# Patient Record
Sex: FEMALE | Race: WHITE | NOT HISPANIC OR LATINO
[De-identification: names, ages, dates, MRNs, and addresses within clinical notes are randomized per-mention and may not be internally consistent; named-entity substitution may affect disease eponyms.]

---

## 2019-09-25 PROBLEM — Z00.00 ENCOUNTER FOR PREVENTIVE HEALTH EXAMINATION: Status: ACTIVE | Noted: 2019-09-25

## 2019-10-24 ENCOUNTER — APPOINTMENT (OUTPATIENT)
Dept: CARDIOLOGY | Facility: CLINIC | Age: 49
End: 2019-10-24
Payer: COMMERCIAL

## 2019-10-24 ENCOUNTER — NON-APPOINTMENT (OUTPATIENT)
Age: 49
End: 2019-10-24

## 2019-10-24 VITALS
HEIGHT: 67 IN | OXYGEN SATURATION: 95 % | BODY MASS INDEX: 27.78 KG/M2 | WEIGHT: 177 LBS | DIASTOLIC BLOOD PRESSURE: 78 MMHG | HEART RATE: 66 BPM | SYSTOLIC BLOOD PRESSURE: 112 MMHG

## 2019-10-24 DIAGNOSIS — F98.8 OTHER SPECIFIED BEHAVIORAL AND EMOTIONAL DISORDERS WITH ONSET USUALLY OCCURRING IN CHILDHOOD AND ADOLESCENCE: ICD-10-CM

## 2019-10-24 DIAGNOSIS — R06.02 SHORTNESS OF BREATH: ICD-10-CM

## 2019-10-24 DIAGNOSIS — R00.2 PALPITATIONS: ICD-10-CM

## 2019-10-24 DIAGNOSIS — J44.9 CHRONIC OBSTRUCTIVE PULMONARY DISEASE, UNSPECIFIED: ICD-10-CM

## 2019-10-24 DIAGNOSIS — Z80.0 FAMILY HISTORY OF MALIGNANT NEOPLASM OF DIGESTIVE ORGANS: ICD-10-CM

## 2019-10-24 DIAGNOSIS — F41.9 ANXIETY DISORDER, UNSPECIFIED: ICD-10-CM

## 2019-10-24 PROCEDURE — 93000 ELECTROCARDIOGRAM COMPLETE: CPT

## 2019-10-24 PROCEDURE — 99205 OFFICE O/P NEW HI 60 MIN: CPT

## 2019-10-24 RX ORDER — MONTELUKAST 10 MG/1
10 TABLET, FILM COATED ORAL
Refills: 3 | Status: ACTIVE | COMMUNITY
Start: 2019-10-24

## 2019-10-24 RX ORDER — ESCITALOPRAM OXALATE 20 MG/1
20 TABLET ORAL DAILY
Qty: 30 | Refills: 5 | Status: ACTIVE | COMMUNITY
Start: 2019-10-24

## 2019-10-24 RX ORDER — BUDESONIDE AND FORMOTEROL FUMARATE DIHYDRATE 80; 4.5 UG/1; UG/1
80-4.5 AEROSOL RESPIRATORY (INHALATION) TWICE DAILY
Refills: 0 | Status: ACTIVE | COMMUNITY
Start: 2019-10-24

## 2019-10-24 RX ORDER — ALBUTEROL SULFATE 90 UG/1
108 (90 BASE) AEROSOL, METERED RESPIRATORY (INHALATION)
Refills: 0 | Status: ACTIVE | COMMUNITY
Start: 2019-10-24

## 2019-10-24 RX ORDER — LISDEXAMFETAMINE DIMESYLATE 70 MG/1
70 CAPSULE ORAL DAILY
Refills: 0 | Status: ACTIVE | COMMUNITY
Start: 2019-10-24

## 2019-10-24 NOTE — REVIEW OF SYSTEMS
[Dyspnea on exertion] : dyspnea during exertion [Eyeglasses] : currently wearing eyeglasses [Cough] : cough [Abdominal Pain] : abdominal pain [Negative] : Heme/Lymph

## 2019-11-01 ENCOUNTER — APPOINTMENT (OUTPATIENT)
Dept: CV DIAGNOSTICS | Facility: HOSPITAL | Age: 49
End: 2019-11-01

## 2019-11-01 ENCOUNTER — OUTPATIENT (OUTPATIENT)
Dept: OUTPATIENT SERVICES | Facility: HOSPITAL | Age: 49
LOS: 1 days | End: 2019-11-01
Payer: COMMERCIAL

## 2019-11-01 ENCOUNTER — APPOINTMENT (OUTPATIENT)
Dept: CV DIAGNOSITCS | Facility: HOSPITAL | Age: 49
End: 2019-11-01

## 2019-11-01 DIAGNOSIS — R06.02 SHORTNESS OF BREATH: ICD-10-CM

## 2019-11-01 DIAGNOSIS — R00.2 PALPITATIONS: ICD-10-CM

## 2019-11-01 PROCEDURE — 93018 CV STRESS TEST I&R ONLY: CPT

## 2019-11-01 PROCEDURE — 93016 CV STRESS TEST SUPVJ ONLY: CPT

## 2019-11-01 PROCEDURE — 93306 TTE W/DOPPLER COMPLETE: CPT

## 2019-11-01 PROCEDURE — 93306 TTE W/DOPPLER COMPLETE: CPT | Mod: 26

## 2019-11-01 PROCEDURE — 93017 CV STRESS TEST TRACING ONLY: CPT

## 2019-11-06 NOTE — HISTORY OF PRESENT ILLNESS
[FreeTextEntry1] : 49 year old female with no significant family history for cardiovascular disease. She carries a personal history of  anxiety ADD on Vyvanse, COPD (according to the patient, her COPD is secondary to a " rare pneumonia of unknown bacterial origin") .\par \par She presents today reporting that she has been experiencing exertional shortness of breath and palpitations that have become more frequent. \par \par Of note, she has begun to feel intermittent hot flashes and other associated  perimenopausal symptoms. \par She does describes frequent symptoms of heartburn and left upper quadrant pain after eating.

## 2019-11-06 NOTE — DISCUSSION/SUMMARY
[FreeTextEntry1] : 49 year old female with no significant family history for cardiovascular disease. She carries a personal history of ADD on Vyvanse, COPD ( secondary to " rare pneumonia of unknown bacterial origin") and anxiety.\par \par She presents today reporting that she has been experiencing exertional shortness of breath and palpitations that have become more frequent. \par \par Of note, she has begun to feel intermittent hot flashes and other associated  perimenopausal symptoms. \par She does describes frequent symptoms of heartburn and left upper quadrant pain after eating. \par - will refer the patient for an echocardiogram and an exercise stress test\par - will place a cardiopatch\par - BP well controlled\par - ECG with no acute changes\par - weight loss and exercise encouraged\par - advised patient to be cautious regarding her ADD medications

## 2019-11-06 NOTE — ASSESSMENT
[FreeTextEntry1] : 49 year old female with  personal history of ADD on Vyvanse, COPD ( secondary to " rare pneumonia of unknown bacterial origin") and anxiety.\par \par She presents today with cpm

## 2020-03-12 ENCOUNTER — APPOINTMENT (OUTPATIENT)
Dept: PLASTIC SURGERY | Facility: CLINIC | Age: 50
End: 2020-03-12
Payer: COMMERCIAL

## 2020-03-12 VITALS — BODY MASS INDEX: 22.29 KG/M2 | WEIGHT: 142 LBS | HEIGHT: 67 IN

## 2020-03-12 PROCEDURE — 99202 OFFICE O/P NEW SF 15 MIN: CPT

## 2020-03-13 NOTE — HISTORY OF PRESENT ILLNESS
[FreeTextEntry1] : Ms. Starr presents for consultation with symptoms of macromastia. Specifically she is bothered by severe neck, shoulder, back pain, and bra strap grooving.  Additionally she experiences intermittent skin inflammation in the breast folds. Her symptoms are not relieved with conventional conservative therapy.\par Today we reviewed options for surgical breast reduction.\par \par Current bra size of 38G \par Weight of 142lbs, height of 5'7" with BSA of 1.748\par On exam the patient has large pendulous breasts which are consistent with the constellation of reported symptoms. No masses, no nipple inversion, no nipple discharge.\par \par Patient reports last mammogram/ultrasound was completed within the last year and was reportedly benign. \par \par To note, patient has COPD secondary to a bout of bacterial pneumonia 5 years ago\par \par Today we reviewed the nature of breast reduction surgery, its risks, benefits, alternatives, and expected recovery and postoperative course. Based on Ms. Starr's symptoms and my physical exam I anticipate removing in excess of 800 g of breast tissue from each breast, which is even more then the 450 g predicted by the Schnur sliding scale.\par Ms. Starr is motivated to proceed.\par We will coordinate surgery for the near future.

## 2020-08-19 NOTE — PHYSICAL EXAM
[General Appearance - Well Developed] : well developed [Normal Appearance] : normal appearance [Well Groomed] : well groomed 2018- pt is going for cardiology consult  on 8/20/20 [General Appearance - Well Nourished] : well nourished [No Deformities] : no deformities [Normal Conjunctiva] : the conjunctiva exhibited no abnormalities [General Appearance - In No Acute Distress] : no acute distress [Eyelids - No Xanthelasma] : the eyelids demonstrated no xanthelasmas [Normal Oral Mucosa] : normal oral mucosa [No Oral Pallor] : no oral pallor [No Oral Cyanosis] : no oral cyanosis [Normal Jugular Venous A Waves Present] : normal jugular venous A waves present [No Jugular Venous Gabriel A Waves] : no jugular venous gabriel A waves [Normal Jugular Venous V Waves Present] : normal jugular venous V waves present [Normal] : normal [Normal Rate] : normal [Normal S2] : normal S2 [No Murmur] : no murmurs heard [2+] : left 2+ [Respiration, Rhythm And Depth] : normal respiratory rhythm and effort [Exaggerated Use Of Accessory Muscles For Inspiration] : no accessory muscle use [Auscultation Breath Sounds / Voice Sounds] : lungs were clear to auscultation bilaterally [Abdomen Soft] : soft [Abdomen Mass (___ Cm)] : no abdominal mass palpated [Abdomen Tenderness] : non-tender [Abnormal Walk] : normal gait [Cyanosis, Localized] : no localized cyanosis [Nail Clubbing] : no clubbing of the fingernails [Gait - Sufficient For Exercise Testing] : the gait was sufficient for exercise testing [Petechial Hemorrhages (___cm)] : no petechial hemorrhages [] : no rash [Skin Color & Pigmentation] : normal skin color and pigmentation [No Venous Stasis] : no venous stasis [No Xanthoma] : no  xanthoma was observed [Skin Lesions] : no skin lesions [No Skin Ulcers] : no skin ulcer [FreeTextEntry1] : anxious

## 2020-12-04 DIAGNOSIS — Z20.828 CONTACT WITH AND (SUSPECTED) EXPOSURE TO OTHER VIRAL COMMUNICABLE DISEASES: ICD-10-CM

## 2020-12-06 LAB — SARS-COV-2 N GENE NPH QL NAA+PROBE: NOT DETECTED

## 2020-12-27 LAB — SARS-COV-2 N GENE NPH QL NAA+PROBE: NOT DETECTED

## 2021-01-11 ENCOUNTER — TRANSCRIPTION ENCOUNTER (OUTPATIENT)
Age: 51
End: 2021-01-11

## 2021-02-08 ENCOUNTER — TRANSCRIPTION ENCOUNTER (OUTPATIENT)
Age: 51
End: 2021-02-08

## 2021-06-16 ENCOUNTER — APPOINTMENT (OUTPATIENT)
Dept: PLASTIC SURGERY | Facility: CLINIC | Age: 51
End: 2021-06-16
Payer: COMMERCIAL

## 2021-06-16 PROCEDURE — 99499 UNLISTED E&M SERVICE: CPT | Mod: NC

## 2021-06-17 NOTE — HISTORY OF PRESENT ILLNESS
[FreeTextEntry1] : Ms. Starr is known to me from a  previous consultation for breast reduction. \par She suffers form symptomatic of macromastia. Specifically she is bothered by severe neck, shoulder, back pain, and bra strap grooving.  Additionally she experiences intermittent skin inflammation in the breast folds. Her symptoms are not relieved with conventional conservative therapy.\par Today we again reviewed options for surgical breast reduction.\par \par Current bra size of 38G \par Weight of 142lbs, height of 5'7" with BSA of 1.748\par On exam the patient has large pendulous breasts which are consistent with the constellation of reported symptoms. No masses, no nipple inversion, no nipple discharge.\par \par Patient reports last mammogram/ultrasound was completed within the last year and was reportedly benign. \par \par To note, patient has COPD secondary to a bout of bacterial pneumonia 5 years ago\par \par Today we reviewed the nature of breast reduction surgery, its risks, benefits, alternatives, and expected recovery and postoperative course. Based on Ms. Starr's symptoms and my physical exam I anticipate removing in excess of 800 g of breast tissue from each breast, which is even more then the 450 g predicted by the Schnur sliding scale.\par Ms. Starr is motivated to proceed.\par We will coordinate surgery for the near future.

## 2021-08-09 ENCOUNTER — APPOINTMENT (OUTPATIENT)
Dept: PLASTIC SURGERY | Facility: CLINIC | Age: 51
End: 2021-08-09
Payer: SELF-PAY

## 2021-08-09 PROCEDURE — 99499 UNLISTED E&M SERVICE: CPT | Mod: NC

## 2021-08-17 NOTE — HISTORY OF PRESENT ILLNESS
[FreeTextEntry1] : Ms. Starr is known to me from a  previous consultation for breast reduction. \par She suffers form symptomatic of macromastia. Specifically she is bothered by severe neck, shoulder, back pain, and bra strap grooving.  Additionally she experiences intermittent skin inflammation in the breast folds. Her symptoms are not relieved with conventional conservative therapy, which includes more than three months of NSAID use, use of special bras, wearing of two bras simultaneously, posture measures, and a tial of physical therapy. Additionally she has been previously evaluated by a spine surgeon who also attributed her back symtoms to her large breasts.\par Today we again reviewed options for surgical breast reduction.\par \par Current bra size of 38G \par Weight of 142lbs, height of 5'7" with BSA of 1.748\par On exam the patient has large pendulous breasts which are consistent with the constellation of reported symptoms. No masses, no nipple inversion, no nipple discharge.\par \par Patient reports last mammogram/ultrasound was completed within the last year and is benign. \par \par To note, patient has COPD secondary to a bout of bacterial pneumonia 5 years ago\par \par Today we reviewed the nature of breast reduction surgery, its risks, benefits, alternatives, and expected recovery and postoperative course. Based on Ms. Starr's symptoms and my physical exam I anticipate removing in excess of 800 g of breast tissue from each breast, which is even more then the 450 g predicted by the Schnur sliding scale.\par Ms. Starr is motivated to proceed.\par We will coordinate surgery for the near future.

## 2021-08-19 ENCOUNTER — APPOINTMENT (OUTPATIENT)
Dept: CARDIOLOGY | Facility: CLINIC | Age: 51
End: 2021-08-19
Payer: COMMERCIAL

## 2021-08-19 VITALS
SYSTOLIC BLOOD PRESSURE: 145 MMHG | DIASTOLIC BLOOD PRESSURE: 86 MMHG | BODY MASS INDEX: 26.68 KG/M2 | HEART RATE: 95 BPM | HEIGHT: 67 IN | WEIGHT: 170 LBS | OXYGEN SATURATION: 97 %

## 2021-08-19 DIAGNOSIS — R73.03 PREDIABETES.: ICD-10-CM

## 2021-08-19 PROCEDURE — 93000 ELECTROCARDIOGRAM COMPLETE: CPT

## 2021-08-19 PROCEDURE — 99215 OFFICE O/P EST HI 40 MIN: CPT

## 2021-08-20 ENCOUNTER — APPOINTMENT (OUTPATIENT)
Dept: CARDIOLOGY | Facility: CLINIC | Age: 51
End: 2021-08-20
Payer: COMMERCIAL

## 2021-08-20 ENCOUNTER — APPOINTMENT (OUTPATIENT)
Dept: PLASTIC SURGERY | Facility: CLINIC | Age: 51
End: 2021-08-20
Payer: COMMERCIAL

## 2021-08-20 PROCEDURE — 93306 TTE W/DOPPLER COMPLETE: CPT

## 2021-08-20 PROCEDURE — 99241 OFFICE CONSULTATION NEW/ESTAB PATIENT 15 MIN: CPT | Mod: GT

## 2021-08-20 RX ORDER — OXYCODONE AND ACETAMINOPHEN 5; 325 MG/1; MG/1
5-325 TABLET ORAL
Qty: 20 | Refills: 0 | Status: ACTIVE | COMMUNITY
Start: 2021-08-20 | End: 1900-01-01

## 2021-08-20 NOTE — HISTORY OF PRESENT ILLNESS
[Home] : at home, [unfilled] , at the time of the visit. [Medical Office: ([facility])___] : at the medical office located in  [Verbal consent obtained from patient] : the patient, [unfilled] [FreeTextEntry1] : Patient Name: JANI LAZO \par : Mar 28 1970 \par Date: 2021 \par Attending: Dr. Martin Cedillo\par \par HPI: pre-op consultation for bilateral breast reduction and liposuction on 21.\par \par Allergies: NKDA\par Medication prescribed: percocet 5-325 mg\par \par ROS: complete 14 point review of systems negative except pertinent items reviewed in the HPI. Other non-contributory items reviewed in our new patient questionnaire and we have submitted it to be scanned into the medical record. \par \par Physical Exam: \par completed at time of in office evaluation \par \par Assessment/Plan:\par We have discussed pre and postop instructions, recovery limitations, restrictions and expectations, ERAS protocol, NPO status, transportation home, postop medications, COVID-19 policies, benefits and risks of the procedure. The patient would like to proceed with surgery as scheduled.\par \par MIRELA BASURTO NP\par \par

## 2021-08-24 ENCOUNTER — LABORATORY RESULT (OUTPATIENT)
Age: 51
End: 2021-08-24

## 2021-08-24 NOTE — DISCUSSION/SUMMARY
[FreeTextEntry1] : 51 year old female with no significant family history for cardiovascular disease. She carries a personal history of  anxiety ADD on Vyvanse, COPD (according to the patient, her COPD is secondary to a " rare pneumonia of unknown bacterial origin") .\par \par She presents today for a cardiac clearance for a breast reduction in setting of thr PCP finding an ecg abnormality. She has not been very active but is asymptomatic - denies chest pain, SOB/ZAMORA, palpitations, lightheadedness or syncope\par - ECG with no acute ischemic changes (stable from prior)\par - BP stable. Encouraged the patient to monitor blood pressure at home, keep a log, and report results back to us for evaluation. Based on results, we will adjust the regimen as necessary.\par - At the current time, patient is at acceptable risk for the procedure. Patient is asymptomatic with no evidence of acute decompensated heart failure, unstable angina or arrhythmias. No further cardiac work up needed at this time.\par - echo done to re-evaluate the LV function is normal \par - weight loss and exercise encouraged\par - Encouraged patient to continue healthy exercise and eating habits, focusing on a Mediterranean style of eating and aiming for the recommended 150 minutes per week of moderate physical activity.\par - Encouraged the patient to find healthy outlets and coping mechanisms to help manage stress, such as physical activity/exercise, reducing workload if possible, spending time with family and friends, engaging in an enjoyable hobby, or using meditation or mindfulness techniques.\par \par \par

## 2021-08-24 NOTE — HISTORY OF PRESENT ILLNESS
[FreeTextEntry1] : 51 year old female with no significant family history for cardiovascular disease. She carries a personal history of  anxiety ADD on Vyvanse, COPD (according to the patient, her COPD is secondary to a " rare pneumonia of unknown bacterial origin") .\par \par She presents today for a cardiac clearance for a breast reduction in setting of thr PCP finding an ecg abnormality. She has not been very active but is asymptomatic - denies chest pain, SOB/ZAMORA, palpitations, lightheadedness or syncope\par \par

## 2021-08-26 ENCOUNTER — TRANSCRIPTION ENCOUNTER (OUTPATIENT)
Age: 51
End: 2021-08-26

## 2021-08-27 ENCOUNTER — OUTPATIENT (OUTPATIENT)
Dept: OUTPATIENT SERVICES | Facility: HOSPITAL | Age: 51
LOS: 1 days | Discharge: HOME | End: 2021-08-27
Payer: COMMERCIAL

## 2021-08-27 ENCOUNTER — RESULT REVIEW (OUTPATIENT)
Age: 51
End: 2021-08-27

## 2021-08-27 PROCEDURE — 88305 TISSUE EXAM BY PATHOLOGIST: CPT | Mod: 26

## 2021-08-27 PROCEDURE — 19318 BREAST REDUCTION: CPT | Mod: 50

## 2021-08-30 ENCOUNTER — APPOINTMENT (OUTPATIENT)
Dept: PLASTIC SURGERY | Facility: CLINIC | Age: 51
End: 2021-08-30
Payer: COMMERCIAL

## 2021-08-30 VITALS — HEART RATE: 82 BPM | WEIGHT: 170 LBS | BODY MASS INDEX: 26.68 KG/M2 | OXYGEN SATURATION: 97 % | HEIGHT: 67 IN

## 2021-08-30 LAB — SURGICAL PATHOLOGY STUDY: SIGNIFICANT CHANGE UP

## 2021-08-30 PROCEDURE — 99024 POSTOP FOLLOW-UP VISIT: CPT

## 2021-08-31 NOTE — HISTORY OF PRESENT ILLNESS
[FreeTextEntry1] : 52 y/o female presents 3 days s/p bilateral breast reduction and liposuction. Denies f/c/n/v. 2 SHILA drains in place to suction. She is taking percocet as needed for pain relief. \par \par PE: Incisions c/d/i, no collections or s/sx of infection, left breast lateral fullness, moderate ecchymosis consistent with postop expectations b/l axilla, steris removed\par \par Gauze to b/l incisions/sports bra x 6 weeks\par D/C abdominal binder.\par SHILA drains removed\par RTC in 1 month

## 2021-09-27 ENCOUNTER — APPOINTMENT (OUTPATIENT)
Dept: PLASTIC SURGERY | Facility: CLINIC | Age: 51
End: 2021-09-27
Payer: COMMERCIAL

## 2021-09-27 VITALS — WEIGHT: 170 LBS | HEIGHT: 67 IN | OXYGEN SATURATION: 98 % | HEART RATE: 82 BPM | BODY MASS INDEX: 26.68 KG/M2

## 2021-09-27 DIAGNOSIS — N62 HYPERTROPHY OF BREAST: ICD-10-CM

## 2021-09-27 PROCEDURE — 99024 POSTOP FOLLOW-UP VISIT: CPT

## 2021-09-28 NOTE — HISTORY OF PRESENT ILLNESS
[FreeTextEntry1] : 52 y/o female presents 3 weeks s/p bilateral breast reduction and liposuction. Denies f/c/n/v. She is very happy with her results. She reports improvement in her back pain. \par \par PE: Incisions healing well, no collections or s/sx of infection, left breast lateral fullness resolving\par \par Silicone strips to incisions\par Postop instructions reviewed\par RTC in 3-4 months\par

## 2021-11-07 PROBLEM — R73.03 PRE-DIABETES: Status: ACTIVE | Noted: 2021-11-07

## 2021-12-16 NOTE — HISTORY OF PRESENT ILLNESS
[FreeTextEntry1] : 52 y/o female presents 4 months s/p bilateral breast reduction and liposuction. Denies f/c/n/v. She is very happy with her results. She reports improvement in her back pain. \par \par PE: Incisions healing well, no collections or s/sx of infection, left breast lateral fullness resolving\par \par Silicone strips to incisions\par Postop instructions reviewed\par RTC in 3-4 months\par

## 2021-12-17 ENCOUNTER — NON-APPOINTMENT (OUTPATIENT)
Age: 51
End: 2021-12-17

## 2021-12-20 ENCOUNTER — APPOINTMENT (OUTPATIENT)
Dept: PLASTIC SURGERY | Facility: CLINIC | Age: 51
End: 2021-12-20

## 2022-05-10 ENCOUNTER — APPOINTMENT (OUTPATIENT)
Dept: SURGERY | Facility: CLINIC | Age: 52
End: 2022-05-10

## 2022-05-10 VITALS — HEIGHT: 64.5 IN | BODY MASS INDEX: 30.03 KG/M2 | WEIGHT: 178.04 LBS

## 2022-05-10 RX ORDER — SEMAGLUTIDE 1.34 MG/ML
2 INJECTION, SOLUTION SUBCUTANEOUS WEEKLY
Qty: 3 | Refills: 0 | Status: COMPLETED | COMMUNITY
Start: 2022-05-10

## 2022-05-10 NOTE — HISTORY OF PRESENT ILLNESS
[FreeTextEntry1] : I had the pleasure of seeing Teressa Starr today in my office for a new breast evaluation.\par \par Imaging\par mammo [facility] 1/19/21-No cicumscribed mass, suspicious mass, microcalcification cluster, nor area of architectural distortion again seen in both breasts. No skin thickening nor nipple retraction again seen in both breasts. No abnormal lymphadenopathy in both axilla.\par \par ultrasound 1/19/21-in comparison to 1/18/20 no change in left breast. Right breast There is an unchanged hypoechoeic solid tissue nodule again seen at 7:00 6cm from nipple measuring 8x4cm

## 2022-06-01 RX ORDER — SEMAGLUTIDE 1.34 MG/ML
2 INJECTION, SOLUTION SUBCUTANEOUS
Qty: 3 | Refills: 0 | Status: ACTIVE | COMMUNITY
Start: 2022-06-01 | End: 1900-01-01

## 2022-06-13 DIAGNOSIS — R11.0 NAUSEA: ICD-10-CM

## 2022-09-12 ENCOUNTER — RX RENEWAL (OUTPATIENT)
Age: 52
End: 2022-09-12

## 2022-09-12 RX ORDER — ONDANSETRON 4 MG/1
4 TABLET ORAL
Qty: 30 | Refills: 0 | Status: ACTIVE | COMMUNITY
Start: 2022-09-12 | End: 1900-01-01

## 2022-09-22 ENCOUNTER — APPOINTMENT (OUTPATIENT)
Dept: HEMATOLOGY ONCOLOGY | Facility: CLINIC | Age: 52
End: 2022-09-22

## 2022-09-22 ENCOUNTER — LABORATORY RESULT (OUTPATIENT)
Age: 52
End: 2022-09-22

## 2022-09-23 LAB
ALBUMIN SERPL ELPH-MCNC: 4.7 G/DL
ALP BLD-CCNC: 89 U/L
ALT SERPL-CCNC: 36 U/L
ANION GAP SERPL CALC-SCNC: 14 MMOL/L
AST SERPL-CCNC: 25 U/L
BILIRUB SERPL-MCNC: 0.3 MG/DL
BUN SERPL-MCNC: 13 MG/DL
CALCIUM SERPL-MCNC: 9.7 MG/DL
CHLORIDE SERPL-SCNC: 106 MMOL/L
CO2 SERPL-SCNC: 24 MMOL/L
CREAT SERPL-MCNC: 0.73 MG/DL
EGFR: 98 ML/MIN/1.73M2
GLUCOSE SERPL-MCNC: 112 MG/DL
POTASSIUM SERPL-SCNC: 4.6 MMOL/L
PROT SERPL-MCNC: 6.9 G/DL
SODIUM SERPL-SCNC: 144 MMOL/L

## 2024-02-15 ENCOUNTER — APPOINTMENT (OUTPATIENT)
Dept: CARDIOLOGY | Facility: CLINIC | Age: 54
End: 2024-02-15
Payer: COMMERCIAL

## 2024-02-15 ENCOUNTER — RESULT REVIEW (OUTPATIENT)
Age: 54
End: 2024-02-15

## 2024-02-15 VITALS
BODY MASS INDEX: 27.1 KG/M2 | DIASTOLIC BLOOD PRESSURE: 77 MMHG | WEIGHT: 153 LBS | HEART RATE: 83 BPM | OXYGEN SATURATION: 99 % | SYSTOLIC BLOOD PRESSURE: 116 MMHG

## 2024-02-15 DIAGNOSIS — R00.2 PALPITATIONS: ICD-10-CM

## 2024-02-15 DIAGNOSIS — R94.31 ABNORMAL ELECTROCARDIOGRAM [ECG] [EKG]: ICD-10-CM

## 2024-02-15 PROCEDURE — 93000 ELECTROCARDIOGRAM COMPLETE: CPT

## 2024-02-15 PROCEDURE — 99214 OFFICE O/P EST MOD 30 MIN: CPT | Mod: 25

## 2024-02-16 ENCOUNTER — APPOINTMENT (OUTPATIENT)
Dept: CV DIAGNOSTICS | Facility: HOSPITAL | Age: 54
End: 2024-02-16

## 2024-02-16 ENCOUNTER — OUTPATIENT (OUTPATIENT)
Dept: OUTPATIENT SERVICES | Facility: HOSPITAL | Age: 54
LOS: 1 days | End: 2024-02-16
Payer: COMMERCIAL

## 2024-02-16 DIAGNOSIS — R00.2 PALPITATIONS: ICD-10-CM

## 2024-02-16 LAB
25(OH)D3 SERPL-MCNC: 29.2 NG/ML
ALBUMIN SERPL ELPH-MCNC: 5.1 G/DL
ALP BLD-CCNC: 91 U/L
ALT SERPL-CCNC: 24 U/L
ANION GAP SERPL CALC-SCNC: 12 MMOL/L
AST SERPL-CCNC: 23 U/L
BILIRUB SERPL-MCNC: 0.4 MG/DL
BUN SERPL-MCNC: 12 MG/DL
CALCIUM SERPL-MCNC: 10.2 MG/DL
CHLORIDE SERPL-SCNC: 103 MMOL/L
CHOLEST SERPL-MCNC: 203 MG/DL
CO2 SERPL-SCNC: 27 MMOL/L
CREAT SERPL-MCNC: 0.72 MG/DL
EGFR: 99 ML/MIN/1.73M2
ESTIMATED AVERAGE GLUCOSE: 120 MG/DL
GLUCOSE SERPL-MCNC: 91 MG/DL
HBA1C MFR BLD HPLC: 5.8 %
HCT VFR BLD CALC: 47.7 %
HDLC SERPL-MCNC: 58 MG/DL
HGB BLD-MCNC: 15.6 G/DL
LDLC SERPL CALC-MCNC: 126 MG/DL
MCHC RBC-ENTMCNC: 30 PG
MCHC RBC-ENTMCNC: 32.7 GM/DL
MCV RBC AUTO: 91.7 FL
NONHDLC SERPL-MCNC: 145 MG/DL
PLATELET # BLD AUTO: 301 K/UL
POTASSIUM SERPL-SCNC: 4.2 MMOL/L
PROT SERPL-MCNC: 7.4 G/DL
RBC # BLD: 5.2 M/UL
RBC # FLD: 13.8 %
SODIUM SERPL-SCNC: 141 MMOL/L
TRIGL SERPL-MCNC: 108 MG/DL
TSH SERPL-ACNC: 1.76 UIU/ML
WBC # FLD AUTO: 8.75 K/UL

## 2024-02-16 PROCEDURE — 93018 CV STRESS TEST I&R ONLY: CPT

## 2024-02-16 PROCEDURE — 93017 CV STRESS TEST TRACING ONLY: CPT

## 2024-02-16 PROCEDURE — 93016 CV STRESS TEST SUPVJ ONLY: CPT

## 2024-03-20 RX ORDER — FLASH GLUCOSE SENSOR
KIT MISCELLANEOUS
Qty: 2 | Refills: 11 | Status: ACTIVE | COMMUNITY
Start: 2021-11-08 | End: 1900-01-01

## 2024-04-30 ENCOUNTER — NON-APPOINTMENT (OUTPATIENT)
Age: 54
End: 2024-04-30

## 2024-04-30 PROCEDURE — 99443: CPT | Mod: 93

## 2024-05-31 DIAGNOSIS — E11.9 TYPE 2 DIABETES MELLITUS W/OUT COMPLICATIONS: ICD-10-CM

## 2024-05-31 RX ORDER — SEMAGLUTIDE 1.34 MG/ML
4 INJECTION, SOLUTION SUBCUTANEOUS
Qty: 4 | Refills: 5 | Status: ACTIVE | COMMUNITY
Start: 2022-07-07 | End: 1900-01-01

## 2024-06-07 NOTE — ADDENDUM
[FreeTextEntry1] : for years pt has had a very hard time managing her glucose control until the time we were able to place a Freestyle Ajit. Would encourage her to maintain her on the ajit

## 2024-06-07 NOTE — HISTORY OF PRESENT ILLNESS
[FreeTextEntry1] : 53 year old female with no significant family history for cardiovascular disease. She carries a personal history of  anxiety ADD on Vyvanse, COPD (according to the patient, her COPD is secondary to a " rare pneumonia of unknown bacterial origin") , DM, obesity (with recent weight loss due to Ozempic) presented to follow up as an acute visit with s/o palpitations and numbness of her right leg. She feels palpitations almost on every day basis with associated dizziness. She does not feel them when she is active. Denies caffeine use. Does not stay well hydrated

## 2024-06-07 NOTE — DISCUSSION/SUMMARY
[EKG obtained to assist in diagnosis and management of assessed problem(s)] : EKG obtained to assist in diagnosis and management of assessed problem(s) [FreeTextEntry1] : 53 year old female with no significant family history for cardiovascular disease. She carries a personal history of  anxiety ADD on Vyvanse, COPD (according to the patient, her COPD is secondary to a " rare pneumonia of unknown bacterial origin") , DM, obesity (with recent weight loss due to Ozempic) presented to follow up as an acute visit with s/o palpitations and numbness of her right leg. She feels palpitations almost on every day basis with associated dizziness. She does not feel them when she is active. Denies caffeine use. Does not stay well hydrated   - ECG with no acute ischemic changes (stable from prior) - BP stable. Encouraged the patient to monitor blood pressure at home, keep a log, and report results back to us for evaluation. Based on results, we will adjust the regimen as necessary. - in light of the plapitations, will place a monitor on the pt and refer for an echo and exercise stress test - advised to stay hydrated - referred for routine blood work as well - weight loss and exercise encouraged - Encouraged patient to continue healthy exercise and eating habits, focusing on a Mediterranean style of eating and aiming for the recommended 150 minutes per week of moderate physical activity. - Encouraged the patient to find healthy outlets and coping mechanisms to help manage stress, such as physical activity/exercise, reducing workload if possible, spending time with family and friends, engaging in an enjoyable hobby, or using meditation or mindfulness techniques.

## 2024-12-25 PROBLEM — F10.90 ALCOHOL USE: Status: INACTIVE | Noted: 2019-10-24

## 2025-03-05 ENCOUNTER — APPOINTMENT (OUTPATIENT)
Dept: CARDIOLOGY | Facility: CLINIC | Age: 55
End: 2025-03-05

## 2025-03-05 ENCOUNTER — APPOINTMENT (OUTPATIENT)
Dept: CARDIOLOGY | Facility: CLINIC | Age: 55
End: 2025-03-05
Payer: SELF-PAY

## 2025-03-05 ENCOUNTER — NON-APPOINTMENT (OUTPATIENT)
Age: 55
End: 2025-03-05

## 2025-03-05 VITALS
SYSTOLIC BLOOD PRESSURE: 117 MMHG | HEART RATE: 81 BPM | DIASTOLIC BLOOD PRESSURE: 83 MMHG | HEIGHT: 63 IN | BODY MASS INDEX: 27.11 KG/M2 | OXYGEN SATURATION: 96 % | WEIGHT: 153 LBS

## 2025-03-05 PROCEDURE — 93000 ELECTROCARDIOGRAM COMPLETE: CPT

## 2025-03-05 PROCEDURE — 99214 OFFICE O/P EST MOD 30 MIN: CPT
